# Patient Record
Sex: FEMALE | Race: BLACK OR AFRICAN AMERICAN
[De-identification: names, ages, dates, MRNs, and addresses within clinical notes are randomized per-mention and may not be internally consistent; named-entity substitution may affect disease eponyms.]

---

## 2020-10-16 ENCOUNTER — HOSPITAL ENCOUNTER (EMERGENCY)
Dept: HOSPITAL 72 - EMR | Age: 45
Discharge: HOME | End: 2020-10-16
Payer: COMMERCIAL

## 2020-10-16 VITALS — BODY MASS INDEX: 24.25 KG/M2 | WEIGHT: 160 LBS | HEIGHT: 68 IN

## 2020-10-16 VITALS — DIASTOLIC BLOOD PRESSURE: 56 MMHG | SYSTOLIC BLOOD PRESSURE: 124 MMHG

## 2020-10-16 VITALS — DIASTOLIC BLOOD PRESSURE: 59 MMHG | SYSTOLIC BLOOD PRESSURE: 126 MMHG

## 2020-10-16 DIAGNOSIS — Z88.6: ICD-10-CM
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
## 2020-10-16 DIAGNOSIS — R51.9: Primary | ICD-10-CM
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
## 2020-10-16 DIAGNOSIS — R00.0: ICD-10-CM

## 2020-10-16 PROCEDURE — 99284 EMERGENCY DEPT VISIT MOD MDM: CPT

## 2020-10-16 PROCEDURE — 70450 CT HEAD/BRAIN W/O DYE: CPT

## 2020-10-16 NOTE — NUR
ER DISCHARGE NOTE:

Patient is cleared to be discharged per ERMD, pt is aox4, on room air, with 
stable vital signs. pt was given dc and prescription instructions, pt was able 
to verbalize understanding, pt id band removed. pt is able to ambulate with 
steady gait. pt took all belongings. Pt instructed regarding concussion and 
f/u.

## 2020-10-16 NOTE — NUR
ED Nurse Note:



resting in bed without s/s of distress. pt made aware that we are waiting for 
CT result.

## 2020-10-16 NOTE — EMERGENCY ROOM REPORT
History of Present Illness


General


Chief Complaint:  General Complaint


Source:  Patient





Present Illness


HPI


45-year-old female presents with headache after mechanical fall patient reports 

she fell a week ago and ever since then she has been feeling pain radiating down

from her head to her body, aggravated by touch alleviated with rest severity is 

mild, intermittent she states that a week ago she had LOC with that mechanical 

slip and fall she currently denies any neck pain no other complaints


Allergies:  


Coded Allergies:  


     MORPHINE (Verified  Allergy, Unknown, 10/16/20)





COVID-19 Screening


Contact w/high risk pt:  No


Experienced COVID-19 symptoms?:  No


COVID-19 Testing performed PTA:  No





Patient History


Past Medical History:  see triage record


Last Menstrual Period:  na


Reviewed Nursing Documentation:  PMH: Agreed; PSxH: Agreed





Nursing Documentation-PMH


Past Medical History:  No Stated History





Review of Systems


All Other Systems:  negative except mentioned in HPI





Physical Exam





Vital Signs








  Date Time  Temp Pulse Resp B/P (MAP) Pulse Ox O2 Delivery O2 Flow Rate FiO2


 


10/16/20 10:34 98.1 99 19 129/57 (81) 97 Room Air  








Sp02 EP Interpretation:  reviewed, normal


General Appearance:  well appearing, no apparent distress, alert


Head:  normocephalic, atraumatic, other - No evidence of trauma


Eyes:  bilateral eye PERRL, bilateral eye EOMI


ENT:  uvula midline, moist mucus membranes


Neck:  supple, thyroid normal, no bony tend, supple/symm/no masses, other - No 

C-spine tenderness


Respiratory:  lungs clear, no respiratory distress, no retraction, no accessory 

muscle use


Cardiovascular #1:  normal peripheral pulses, no edema, no gallop, no murmur, 

tachycardia


Gastrointestinal:  no guarding, no rebound


Musculoskeletal:  normal inspection


Neurologic:  alert, oriented x3


Psychiatric:  anxious


Skin:  no rash, warm/dry





Medical Decision Making


ER Course


45-year-old female deferred a pregnancy test states she is aware of the risks 

and benefits


Differential for headache acute head trauma, traumatic subarachnoid, 

intracranial bleed


CT scan negative, patient most likely with a concussion after fall


CT/MRI/US Diagnostic Results


CT/MRI/US Diagnostic Results :  


   Impression


CT head: Per radiology no acute intracranial processes





Last Vital Signs








  Date Time  Temp Pulse Resp B/P (MAP) Pulse Ox O2 Delivery O2 Flow Rate FiO2


 


10/16/20 10:34 98.1 99 19 129/57 (81) 97 Room Air  








Disposition:  HOME, SELF-CARE


Condition:  Stable


Scripts


Acetaminophen (Tylenol) 325 Mg Tablet


650 MG ORAL Q6H PRN for Prn Pain/Headache/Temp > 101, #30 TAB 0 Refills


   Prov: Chris Holland MD         10/16/20


Referrals:  


[facility]





[facility]


Patient Instructions:  Concussion, Adult, Easy-to-Read, Head Injury, Adult, 

Easy-to-Read





Additional Instructions:  


The patient was provided with discharge instructions, notified to follow-up with

a primary care doctor and or specialist in the next 24-48 hours, and to return 

to the ED if they have worsening of their symptoms. 





Please note that this report is being documented using [employer] technology.


This can lead to erroneous entry secondary to incorrect interpretation by the 

dictating instrument.











Chris Holland MD          Oct 16, 2020 11:03

## 2020-10-16 NOTE — DIAGNOSTIC IMAGING REPORT
Indications: Headache, mechanical fall

 

Technique: Spiral acquisitions obtained through the brain. Angled axial and coronal 5

x 5 mm slices were reconstructed. Total dose length product 1045 mGycm.  CTDI vol(s)

53 mGy. Dose reduction achieved using automated exposure control

 

Comparison: None.

 

Findings: No acute intracranial hemorrhage or edema. No mass effect nor midline

shift. Normal gray-white differentiation. Normal size ventricles and extra axial CSF

spaces. Intact calvarium. Visualized orbits and sinuses are unremarkable. The

mastoids are clear.

 

Impression: Negative

 

 

 

 

 

The CT scanner at [facility] is accredited by the American College of

Radiology and the scans are performed using protocols designed to limit radiation

exposure to as low as reasonably achievable to attain images of sufficient resolution

adequate for diagnostic evaluation.

## 2020-10-16 NOTE — NUR
ED Nurse Note:

Pt walked into due to fall 1 weeks ago and head pain 7/10. Pt reports general 
bodyaches and is "requesting CT exam." Pt is alert and orientedx4, ambulatory. 
Pt has been seen by JOSE M. Set up on monitor.

## 2020-10-19 ENCOUNTER — HOSPITAL ENCOUNTER (EMERGENCY)
Dept: HOSPITAL 72 - EMR | Age: 45
Discharge: HOME | End: 2020-10-19
Payer: COMMERCIAL

## 2020-10-19 VITALS — HEIGHT: 68 IN | BODY MASS INDEX: 24.25 KG/M2 | WEIGHT: 160 LBS

## 2020-10-19 VITALS — DIASTOLIC BLOOD PRESSURE: 85 MMHG | SYSTOLIC BLOOD PRESSURE: 133 MMHG

## 2020-10-19 VITALS — DIASTOLIC BLOOD PRESSURE: 89 MMHG | SYSTOLIC BLOOD PRESSURE: 137 MMHG

## 2020-10-19 DIAGNOSIS — Z87.891: ICD-10-CM

## 2020-10-19 DIAGNOSIS — Z88.6: ICD-10-CM

## 2020-10-19 DIAGNOSIS — R07.9: ICD-10-CM

## 2020-10-19 DIAGNOSIS — R14.0: Primary | ICD-10-CM

## 2020-10-19 LAB
ADD MANUAL DIFF: NO
ALBUMIN SERPL-MCNC: 3.6 G/DL (ref 3.4–5)
ALBUMIN/GLOB SERPL: 1.2 {RATIO} (ref 1–2.7)
ALP SERPL-CCNC: 69 U/L (ref 46–116)
ALT SERPL-CCNC: 8 U/L (ref 12–78)
APPEARANCE UR: CLEAR
APTT PPP: (no result) S
AST SERPL-CCNC: 12 U/L (ref 15–37)
BASOPHILS NFR BLD AUTO: 2.8 % (ref 0–2)
BILIRUB SERPL-MCNC: 0.5 MG/DL (ref 0.2–1)
BUN SERPL-MCNC: 9 MG/DL (ref 7–18)
CALCIUM SERPL-MCNC: 8.4 MG/DL (ref 8.5–10.1)
CHLORIDE SERPL-SCNC: 103 MMOL/L (ref 98–107)
CO2 SERPL-SCNC: 28 MMOL/L (ref 21–32)
CREAT SERPL-MCNC: 1.1 MG/DL (ref 0.55–1.3)
EOSINOPHIL NFR BLD AUTO: 0.8 % (ref 0–3)
ERYTHROCYTE [DISTWIDTH] IN BLOOD BY AUTOMATED COUNT: 11.8 % (ref 11.6–14.8)
GLOBULIN SER-MCNC: 2.9 G/DL
GLUCOSE UR STRIP-MCNC: NEGATIVE MG/DL
HCT VFR BLD CALC: 39.1 % (ref 37–47)
HGB BLD-MCNC: 13.9 G/DL (ref 12–16)
KETONES UR QL STRIP: NEGATIVE
LEUKOCYTE ESTERASE UR QL STRIP: NEGATIVE
LYMPHOCYTES NFR BLD AUTO: 35.9 % (ref 20–45)
MCV RBC AUTO: 82 FL (ref 80–99)
MONOCYTES NFR BLD AUTO: 7.5 % (ref 1–10)
NEUTROPHILS NFR BLD AUTO: 53 % (ref 45–75)
NITRITE UR QL STRIP: NEGATIVE
PH UR STRIP: 6 [PH] (ref 4.5–8)
PLATELET # BLD: 276 K/UL (ref 150–450)
POTASSIUM SERPL-SCNC: 3.4 MMOL/L (ref 3.5–5.1)
PROT UR QL STRIP: NEGATIVE
RBC # BLD AUTO: 4.74 M/UL (ref 4.2–5.4)
SODIUM SERPL-SCNC: 138 MMOL/L (ref 136–145)
SP GR UR STRIP: 1.01 (ref 1–1.03)
UROBILINOGEN UR-MCNC: NORMAL MG/DL (ref 0–1)
WBC # BLD AUTO: 6.4 K/UL (ref 4.8–10.8)

## 2020-10-19 PROCEDURE — 83880 ASSAY OF NATRIURETIC PEPTIDE: CPT

## 2020-10-19 PROCEDURE — 84484 ASSAY OF TROPONIN QUANT: CPT

## 2020-10-19 PROCEDURE — 80053 COMPREHEN METABOLIC PANEL: CPT

## 2020-10-19 PROCEDURE — 99283 EMERGENCY DEPT VISIT LOW MDM: CPT

## 2020-10-19 PROCEDURE — 36415 COLL VENOUS BLD VENIPUNCTURE: CPT

## 2020-10-19 PROCEDURE — 93005 ELECTROCARDIOGRAM TRACING: CPT

## 2020-10-19 PROCEDURE — 81025 URINE PREGNANCY TEST: CPT

## 2020-10-19 PROCEDURE — 85379 FIBRIN DEGRADATION QUANT: CPT

## 2020-10-19 PROCEDURE — 71045 X-RAY EXAM CHEST 1 VIEW: CPT

## 2020-10-19 PROCEDURE — 85025 COMPLETE CBC W/AUTO DIFF WBC: CPT

## 2020-10-19 PROCEDURE — 80307 DRUG TEST PRSMV CHEM ANLYZR: CPT

## 2020-10-19 PROCEDURE — 81003 URINALYSIS AUTO W/O SCOPE: CPT

## 2020-10-19 NOTE — NUR
ED Nurse Note:



Patient walked into ED for c/o abdominal bloating x 1 day. She states she began 
burping consistently last night and noticed she was bloated shortly after. She 
also reports chest pain that started 1 day ago as well. She denies n/v/d, 
cough, SOB. Patient is aaox4, breathing is normal and unlabored. Pt notes she 
took 25mg seroquel prior to coming to ED for sleeping aid. Patient is poor 
historian and has multiple versions of why she presents to the ED. She also 
wants to "have her heart checked".

## 2020-10-19 NOTE — EMERGENCY ROOM REPORT
History of Present Illness


General


Chief Complaint:  General Complaint


Source:  Patient





Present Illness


HPI


Patient is a 45-year-old female presents for increased abdominal distention and 

belching.  Reports having prior history of cigarette smoking but states she is 

quit recently.  Denies any fever.  And not been having a cough.  Reports having 

recently been taking Seroquel.  Denies any vomiting or diarrhea.  States has had

normal bowel movement today.  Denies being pregnant.  Pain is intermittent 

related to stomach distention.


Allergies:  


Coded Allergies:  


     MORPHINE (Verified  Allergy, Unknown, 10/16/20)





COVID-19 Screening


Contact w/high risk pt:  No


Experienced COVID-19 symptoms?:  No


COVID-19 Testing performed PTA:  No





Patient History


Past Medical History:  see triage record


Reviewed Nursing Documentation:  PMH: Agreed; PSxH: Agreed





Nursing Documentation-PMH


Past Medical History:  No Stated History





Review of Systems


All Other Systems:  negative except mentioned in HPI





Physical Exam





Vital Signs








  Date Time  Temp Pulse Resp B/P (MAP) Pulse Ox O2 Delivery O2 Flow Rate FiO2


 


10/19/20 02:10 98.1 76 14 137/89 (105) 96 Room Air  








Sp02 EP Interpretation:  reviewed, normal


General Appearance:  normal inspection, well appearing, no apparent distress, 

alert, GCS 15


Head:  atraumatic


ENT:  normal ENT inspection, hearing grossly normal, normal voice


Neck:  normal inspection, full range of motion, supple, no bony tend


Respiratory:  normal inspection, lungs clear, normal breath sounds, no 

respiratory distress, no retraction, no wheezing


Cardiovascular #1:  regular rate, rhythm, no edema


Gastrointestinal:  normal inspection, normal bowel sounds, non tender, soft, no 

guarding, no hernia


Genitourinary:  no CVA tenderness


Musculoskeletal:  normal inspection, back normal, normal range of motion


Neurologic:  alert, motor strength/tone normal, CNs III-XII nml as tested, 

oriented x3, responsive, speech normal, normal inspection


Psychiatric:  normal inspection, judgement/insight normal, mood/affect normal





Medical Decision Making


ER Course


Patient presented for chest pain.  Differential diagnosis include was not 

limited to dyspepsia, gastritis, pancreatitis, myocardial infarction, pneumonia 

among others.  Because of complexity of patient's case laboratory tests and 

imaging studies were ordered.





Last Vital Signs








  Date Time  Temp Pulse Resp B/P (MAP) Pulse Ox O2 Delivery O2 Flow Rate FiO2


 


10/19/20 02:10 98.1 76 14 137/89 (105) 96 Room Air  








Referrals:  


NOT CHOSEN [facility]/MD,REFERRING (PCP)











Trav Adams MD               Oct 19, 2020 02:40

## 2020-10-19 NOTE — DIAGNOSTIC IMAGING REPORT
EXAM:

  XR Chest, 1 View

 

CLINICAL HISTORY:

  SOB

 

TECHNIQUE:

  Frontal view of the chest.

 

COMPARISON:

  No relevant prior studies available.

 

FINDINGS:

  Lungs:  Unremarkable.  No consolidation.

  Pleural space:  Unremarkable.  No pneumothorax.

  Heart:  Unremarkable.  No cardiomegaly.

  Mediastinum:  Unremarkable.

  Bones/joints:  Unremarkable.

 

IMPRESSION:     

  Normal chest x-ray. How Severe Is Your Skin Lesion?: mild Has Your Skin Lesion Been Treated?: not been treated Is This A New Presentation, Or A Follow-Up?: Growths Additional History: Pt did use Efudex once a day for ten days to the spots on her left hand and left forearm and has not seen any improvement Is This A New Presentation, Or A Follow-Up?: Growth

## 2020-10-21 NOTE — CARDIOLOGY REPORT
--------------- APPROVED REPORT --------------





EKG Measurement

Heart [id number]

KS [id number]

[id number]

[id number]

[id number]



<Conclusion>

Normal sinus rhythm

Possible Left atrial enlargement

Borderline ECG

## 2020-12-23 ENCOUNTER — HOSPITAL ENCOUNTER (EMERGENCY)
Dept: HOSPITAL 72 - EMR | Age: 45
Discharge: HOME | End: 2020-12-23
Payer: MEDICAID

## 2020-12-23 VITALS — DIASTOLIC BLOOD PRESSURE: 68 MMHG | SYSTOLIC BLOOD PRESSURE: 106 MMHG

## 2020-12-23 VITALS — SYSTOLIC BLOOD PRESSURE: 106 MMHG | DIASTOLIC BLOOD PRESSURE: 68 MMHG

## 2020-12-23 VITALS — HEIGHT: 68 IN | BODY MASS INDEX: 25.76 KG/M2 | WEIGHT: 170 LBS

## 2020-12-23 DIAGNOSIS — R14.0: ICD-10-CM

## 2020-12-23 DIAGNOSIS — Z88.5: ICD-10-CM

## 2020-12-23 DIAGNOSIS — K59.00: Primary | ICD-10-CM

## 2020-12-23 LAB
APPEARANCE UR: CLEAR
APTT PPP: (no result) S
GLUCOSE UR STRIP-MCNC: NEGATIVE MG/DL
KETONES UR QL STRIP: NEGATIVE
LEUKOCYTE ESTERASE UR QL STRIP: NEGATIVE
NITRITE UR QL STRIP: NEGATIVE
PH UR STRIP: 5 [PH] (ref 4.5–8)
PROT UR QL STRIP: (no result)
SP GR UR STRIP: 1.02 (ref 1–1.03)
UROBILINOGEN UR-MCNC: NORMAL MG/DL (ref 0–1)

## 2020-12-23 PROCEDURE — 81003 URINALYSIS AUTO W/O SCOPE: CPT

## 2020-12-23 PROCEDURE — 74018 RADEX ABDOMEN 1 VIEW: CPT

## 2020-12-23 PROCEDURE — 99284 EMERGENCY DEPT VISIT MOD MDM: CPT

## 2020-12-23 PROCEDURE — 81025 URINE PREGNANCY TEST: CPT

## 2020-12-23 PROCEDURE — 87086 URINE CULTURE/COLONY COUNT: CPT

## 2020-12-23 NOTE — DIAGNOSTIC IMAGING REPORT
Indication: Abdominal pain

 

Technique: Supine view of the abdomen

 

Comparison: none

 

Findings: Bowel gas pattern is unremarkable. No unusual masses or calcifications.

There is moderate retained colonic stool

 

Impression: No acute process

## 2020-12-23 NOTE — NUR
ED Nurse Note:



patient from home and walked in due to bloatedness and constipation for over 1 
week. Pt had a BM 4 days ago. abdomen soft and nondistended, bowel sounds 
present on all four quadrants, vss, nad noted, ambulatory, a/ox4

## 2020-12-23 NOTE — EMERGENCY ROOM REPORT
History of Present Illness


General


Chief Complaint:  Constipation


Source:  Patient





Present Illness


HPI


44 YO Female presents to the emergency department complaining of bloating after 

oral intake is 1 week and constipation x4 days.  Patient reports she has 

sensation/urge for bowel movement but is unproductive.  Patient reports she has 

normal hydration.  She denies nausea or vomiting.  She also reports frequent b

urping after oral intake as well.  Patient states she did have history of acid 

reflux in the past but never took her medication.  She denies fevers or chills. 

She denies suspicion of pregnancy.  She denies pain or tenderness in the 

abdomen.  She denies blood in the stool or black tarry stools.  She denies 

history of hemorrhoids.  She denies urinary frequency, urgency or dysuria.  

Patient reports she is currently on her menstrual cycle.  No other aggravating 

or relieving factors at this time.


Allergies:  


Coded Allergies:  


     MORPHINE (Verified  Allergy, Unknown, 10/16/20)





COVID-19 Screening


Contact w/high risk pt:  No


Experienced COVID-19 symptoms?:  No


COVID-19 Testing performed PTA:  No





Patient History


Past Medical History:  see triage record


Past Surgical History:  none


Pertinent Family History:  none


Last Menstrual Period:  currently on her period


Pregnant Now:  No


Reviewed Nursing Documentation:  PMH: Agreed; PSxH: Agreed





Nursing Documentation-PMH


Past Medical History:  No Stated History





Review of Systems


All Other Systems:  negative except mentioned in HPI





Physical Exam





Vital Signs








  Date Time  Temp Pulse Resp B/P (MAP) Pulse Ox O2 Delivery O2 Flow Rate FiO2


 


12/23/20 14:24 98.2 72 20 106/68 (81) 97 Room Air  








Sp02 EP Interpretation:  reviewed, normal


General Appearance:  no apparent distress, alert, GCS 15, non-toxic


Head:  normocephalic, atraumatic


Eyes:  bilateral eye normal inspection, bilateral eye PERRL


ENT:  hearing grossly normal, normal voice


Neck:  full range of motion


Respiratory:  lungs clear, normal breath sounds, speaking full sentences


Cardiovascular #1:  regular rate, rhythm


Gastrointestinal:  normal bowel sounds, non tender, soft, non-distended - no 

visible distention on PE, no guarding


Genitourinary:  normal inspection, no CVA tenderness


Musculoskeletal:  normal range of motion, gait/station normal, non-tender


Neurologic:  alert, motor strength/tone normal, oriented x3, sensory intact, 

responsive, speech normal


Psychiatric:  judgement/insight normal


Skin:  normal color, normal inspection, warm/dry, well hydrated





Medical Decision Making


PA Attestation


Dr. Clark Is my supervising Physician whom patient management has been 

discussed with.


Diagnostic Impression:  


   Primary Impression:  


   Constipation


   Qualified Codes:  K59.00 - Constipation, unspecified


   Additional Impression:  


   Abdominal bloating


ER Course


44 YO Female presents to the emergency department complaining of bloating after 

oral intake is 1 week and constipation x4 days.  Patient reports she has 

sensation/urge for bowel movement but is unproductive.  Patient reports she has 

normal hydration.  She denies nausea or vomiting.  She also reports frequent 

burping after oral intake as well.  Patient states she did have history of acid 

reflux in the past but never took her medication.  She denies fevers or chills. 

She denies suspicion of pregnancy.  She denies pain or tenderness in the 

abdomen.  She denies blood in the stool or black tarry stools.  She denies 

history of hemorrhoids.  She denies urinary frequency, urgency or dysuria.  Pat

ient reports she is currently on her menstrual cycle.  No other aggravating or 

relieving factors at this time.








Ddx considered but are not limited to constipation , appendicitis, SBO, ectopic 

pregnancy, PID, tubo-ovarian abscess.





Vital signs: are WNL, pt. is afebrile





H&PE are most consistent with constipation. bowl sounds are normo active. 





ORDERS: Abdominal KUB: WNL





- UA: Most indicative of contamination: presence of equal amounts of  bacteria 

and  squamous cells, no elevation in inflammatory markers, nitrite negative.


-Urine Hcg: NEgative





ED INTERVENTIONS: GI cocktail, pt. states  burping and indigestion discomfort 

has subsided after administration of GI cocktail.She reports Still feeling 

bloated.





--300ml Mag Citrate.








-I do not identify an emergent condition at this time. With current 

presentation,  pt. is stable for close outpatient follow up and conservative 

treatment. Pt. given strict ED return precautions.  D/w pt. to return promptly 

to ED with worsening or new symptoms.- Pt. verbalizes' understanding and 

agreement with proposed treatment plan.





DISCHARGE: At this time pt. is stable for d/c to home. Will provide printed 

patient care instructions, and any necessary prescriptions. Care plan and follow

up instructions have been discussed with the patient prior to discharge.





Labs








Test


 12/23/20


16:20


 


Urine Color Cristina 


 


Urine Appearance Clear 


 


Urine pH 5 (4.5-8.0) 


 


Urine Specific Gravity


 1.025


(1.005-1.035)


 


Urine Protein 1+ (NEGATIVE) 


 


Urine Glucose (UA)


 Negative


(NEGATIVE)


 


Urine Ketones


 Negative


(NEGATIVE)


 


Urine Blood 5+ (NEGATIVE) 


 


Urine Nitrite


 Negative


(NEGATIVE)


 


Urine Bilirubin


 Negative


(NEGATIVE)


 


Urine Ictotest


 Negative


(NEGATIVE)


 


Urine Urobilinogen


 Normal MG/DL


(0.0-1.0)


 


Urine Leukocyte Esterase


 Negative


(NEGATIVE)


 


Urine RBC


 15-20 /HPF (0


- 2)


 


Urine WBC


 0-2 /HPF (0 -


2)


 


Urine Squamous Epithelial


Cells Moderate /LPF


(NONE/OCC)


 


Urine Bacteria


 Moderate /HPF


(NONE)








Other X-Ray Diagnostic Results


Other X-Ray Diagnostic Results :  


   X-Ray ordered:  Abdominal KUB


   # of Views/Limited Vs Complete:  1 View


   Indication:  Pain


   EP Interpretation:  Yes


   PA Xray:  Interpretation reviewed, by supervising MD, and agrees with 

findings.


   Interpretation:  nonspecific bowel gas, no sbo


   Impression:  No acute disease


   Electronically Signed by:  Katrin Banuelos PA-C





Last Vital Signs








  Date Time  Temp Pulse Resp B/P (MAP) Pulse Ox O2 Delivery O2 Flow Rate FiO2


 


12/23/20 14:24 98.2 72 20 106/68 (81) 97 Room Air  








Disposition:  HOME, SELF-CARE


Condition:  Stable


Scripts


Famotidine* (Pepcid 20mg tablet*) 20 Mg Tablet


20 MG ORAL DAILY for Gerd, #10 TAB 0 Refills


   Prov: Katrin Banuelos         12/23/20 


Docusate Sodium* (COLACE*) 100 Mg Capsule


100 MG ORAL THREE TIMES A DAY, #30 CAP


   Prov: Katrin aBnuelos         12/23/20 


Magnesium Citrate (CITRATE OF MAGNESIA) 296 Ml Solution


20 ML PO TID, #296 ML


   Prov: Katrin Banuelos         12/23/20


Patient Instructions:  Constipation, Adult





Additional Instructions:  


Take medications as directed. 


 ** Follow up with a Primary Care Provider in 3-5 days, even if your symptoms 

have resolved. ** 


-


Return sooner to ED if new symptoms occur, or current symptoms become worse. 














- Please note that this Emergency Department Report was dictated using [employer] technology software, occasionally this can lead to 

erroneous entry secondary to interpretation by the dictation equipment.











Katrin Banuelos              Dec 23, 2020 15:22

## 2020-12-23 NOTE — NUR
ED Nurse Note:



Patient cleared by health care Provider for discharge.  DC 
instructions/prescription was given and explained to pt and verbalized 
understanding of teachings. All medical deviecs such as ID band  removed. Pt is 
AAO x4, ambulatory and left with all personal belongings.

## 2021-01-05 ENCOUNTER — HOSPITAL ENCOUNTER (EMERGENCY)
Dept: HOSPITAL 72 - EMR | Age: 46
LOS: 1 days | Discharge: HOME | End: 2021-01-06
Payer: MEDICAID

## 2021-01-05 VITALS — BODY MASS INDEX: 25.76 KG/M2 | HEIGHT: 68 IN | WEIGHT: 170 LBS

## 2021-01-05 VITALS — DIASTOLIC BLOOD PRESSURE: 89 MMHG | SYSTOLIC BLOOD PRESSURE: 154 MMHG

## 2021-01-05 DIAGNOSIS — F15.10: Primary | ICD-10-CM

## 2021-01-05 DIAGNOSIS — Z88.5: ICD-10-CM

## 2021-01-05 DIAGNOSIS — F41.9: ICD-10-CM

## 2021-01-05 DIAGNOSIS — F29: ICD-10-CM

## 2021-01-05 LAB
ADD MANUAL DIFF: NO
ALBUMIN SERPL-MCNC: 4.1 G/DL (ref 3.4–5)
ALBUMIN/GLOB SERPL: 1 {RATIO} (ref 1–2.7)
ALP SERPL-CCNC: 83 U/L (ref 46–116)
ALT SERPL-CCNC: 29 U/L (ref 12–78)
ANION GAP SERPL CALC-SCNC: 8 MMOL/L (ref 5–15)
APPEARANCE UR: CLEAR
APTT PPP: (no result) S
AST SERPL-CCNC: 29 U/L (ref 15–37)
BASOPHILS NFR BLD AUTO: 1.8 % (ref 0–2)
BILIRUB SERPL-MCNC: 0.5 MG/DL (ref 0.2–1)
BUN SERPL-MCNC: 11 MG/DL (ref 7–18)
CALCIUM SERPL-MCNC: 8.8 MG/DL (ref 8.5–10.1)
CHLORIDE SERPL-SCNC: 103 MMOL/L (ref 98–107)
CO2 SERPL-SCNC: 26 MMOL/L (ref 21–32)
CREAT SERPL-MCNC: 1.1 MG/DL (ref 0.55–1.3)
EOSINOPHIL NFR BLD AUTO: 0.3 % (ref 0–3)
ERYTHROCYTE [DISTWIDTH] IN BLOOD BY AUTOMATED COUNT: 12.9 % (ref 11.6–14.8)
GLOBULIN SER-MCNC: 4 G/DL
GLUCOSE UR STRIP-MCNC: NEGATIVE MG/DL
HCT VFR BLD CALC: 43.3 % (ref 37–47)
HGB BLD-MCNC: 14.5 G/DL (ref 12–16)
KETONES UR QL STRIP: NEGATIVE
LEUKOCYTE ESTERASE UR QL STRIP: NEGATIVE
LYMPHOCYTES NFR BLD AUTO: 34.2 % (ref 20–45)
MCV RBC AUTO: 89 FL (ref 80–99)
MONOCYTES NFR BLD AUTO: 5.2 % (ref 1–10)
NEUTROPHILS NFR BLD AUTO: 58.6 % (ref 45–75)
NITRITE UR QL STRIP: NEGATIVE
PH UR STRIP: 6 [PH] (ref 4.5–8)
PLATELET # BLD: 309 K/UL (ref 150–450)
POTASSIUM SERPL-SCNC: 3.3 MMOL/L (ref 3.5–5.1)
PROT UR QL STRIP: NEGATIVE
RBC # BLD AUTO: 4.84 M/UL (ref 4.2–5.4)
SODIUM SERPL-SCNC: 137 MMOL/L (ref 136–145)
SP GR UR STRIP: 1.01 (ref 1–1.03)
UROBILINOGEN UR-MCNC: NORMAL MG/DL (ref 0–1)
WBC # BLD AUTO: 9.5 K/UL (ref 4.8–10.8)

## 2021-01-05 PROCEDURE — 81003 URINALYSIS AUTO W/O SCOPE: CPT

## 2021-01-05 PROCEDURE — 70450 CT HEAD/BRAIN W/O DYE: CPT

## 2021-01-05 PROCEDURE — 85025 COMPLETE CBC W/AUTO DIFF WBC: CPT

## 2021-01-05 PROCEDURE — 80307 DRUG TEST PRSMV CHEM ANLYZR: CPT

## 2021-01-05 PROCEDURE — 36415 COLL VENOUS BLD VENIPUNCTURE: CPT

## 2021-01-05 PROCEDURE — 96374 THER/PROPH/DIAG INJ IV PUSH: CPT

## 2021-01-05 PROCEDURE — 83690 ASSAY OF LIPASE: CPT

## 2021-01-05 PROCEDURE — 99284 EMERGENCY DEPT VISIT MOD MDM: CPT

## 2021-01-05 PROCEDURE — 71045 X-RAY EXAM CHEST 1 VIEW: CPT

## 2021-01-05 PROCEDURE — 93005 ELECTROCARDIOGRAM TRACING: CPT

## 2021-01-05 PROCEDURE — 84484 ASSAY OF TROPONIN QUANT: CPT

## 2021-01-05 PROCEDURE — 80053 COMPREHEN METABOLIC PANEL: CPT

## 2021-01-05 NOTE — DIAGNOSTIC IMAGING REPORT
EXAM:

  CT Head Without Intravenous Contrast

 

CLINICAL HISTORY:

  PAIN

 

TECHNIQUE:

  Axial computed tomography images of the head/brain without intravenous 

contrast.  CTDI is 53.40 mGy and DLP is 965.40 mGy-cm.  One or more of 

the following dose reduction techniques were used: automated exposure 

control, adjustment of the mA and/or kV according to patient size, use of 

iterative reconstruction technique.

 

COMPARISON:

CT head without contrast 10/16/2020.

 

FINDINGS:

  Brain:  Unremarkable.  No hemorrhage.  No significant white matter 

disease.  No edema.

  Ventricles:  Unremarkable.  No ventriculomegaly.

  Bones/joints:  Unremarkable.  No acute fracture.

  Soft tissues:  Unremarkable.

  Sinuses:  Unremarkable as visualized.  No acute sinusitis.

  Mastoid air cells:  Unremarkable as visualized.  No mastoid effusion.

 

IMPRESSION:     

No acute intracranial abnormality.

## 2021-01-05 NOTE — NUR
ED Nurse Note: Pt walked in from home. She is axox4. Ambulatory walks with a 
steady gait. Her vitals are stable as documented on RA. SHe is tachycardic in 
the 120s-140s. She states that she started to feel numbness in her fingers and 
toes after doing some meth earlier today. Placed pt on monitor, IV placed, labs 
sent, ativan given, pt left for CT. Pt is resting comfortably, breating is even 
and unlabored. No signs of distress noted.

## 2021-01-06 VITALS — SYSTOLIC BLOOD PRESSURE: 136 MMHG | DIASTOLIC BLOOD PRESSURE: 85 MMHG

## 2021-01-06 NOTE — CARDIOLOGY REPORT
--------------- APPROVED REPORT --------------





EKG Measurement

Heart [id number]

MS [id number]

[id number]

[id number]

[id number]



<Conclusion>

Sinus tachycardia

Biatrial enlargement

T wave abnormality, consider inferior ischemia

T wave abnormality, consider anterolateral ischemia

Abnormal ECG

## 2021-01-06 NOTE — DIAGNOSTIC IMAGING REPORT
Indication: Shortness of breath

 

Technique: One view of the chest

 

Comparison: 10/19/2020

 

Findings: Lead cables overlie the left chest, could obscure pathology. The lungs and

pleural spaces are otherwise clear. The heart size is normal..

 

Impression: Limited exam. No definite acute process

## 2021-01-06 NOTE — NUR
ER DISCHARGE NOTE:

Patient is cleared to be discharged per ERMD, pt is aox4, on room air, with 
stable vital signs. pt was given dc and prescription instructions, pt was able 
to verbalize understanding, pt id band and iv site removed without 
complications. pt is able to ambulate with steady gait. pt took all belongings. 
Pt left in a prvate car.